# Patient Record
Sex: MALE | Race: BLACK OR AFRICAN AMERICAN | Employment: OTHER | URBAN - METROPOLITAN AREA
[De-identification: names, ages, dates, MRNs, and addresses within clinical notes are randomized per-mention and may not be internally consistent; named-entity substitution may affect disease eponyms.]

---

## 2019-07-17 ENCOUNTER — OFFICE VISIT (OUTPATIENT)
Dept: UROLOGY | Age: 74
End: 2019-07-17

## 2019-07-17 VITALS
HEIGHT: 65 IN | WEIGHT: 148 LBS | DIASTOLIC BLOOD PRESSURE: 77 MMHG | HEART RATE: 60 BPM | OXYGEN SATURATION: 95 % | BODY MASS INDEX: 24.66 KG/M2 | SYSTOLIC BLOOD PRESSURE: 121 MMHG

## 2019-07-17 DIAGNOSIS — C61 PROSTATE CANCER (HCC): Primary | ICD-10-CM

## 2019-07-17 RX ORDER — TAMSULOSIN HYDROCHLORIDE 0.4 MG/1
0.4 CAPSULE ORAL
COMMUNITY

## 2019-07-17 NOTE — PROGRESS NOTES
Mr. Blanquita Marroquin has a reminder for a \"due or due soon\" health maintenance. I have asked that he contact his primary care provider for follow-up on this health maintenance.

## 2019-07-17 NOTE — PATIENT INSTRUCTIONS
Prostate Cancer: Care Instructions  Your Care Instructions    The prostate gland is a small, walnut-shaped organ that lies just below a man's bladder. It surrounds the urethra, the tube that carries urine from the bladder out of the body through the penis. Prostate cancer is the abnormal growth of cells in the prostate gland. Prostate cancer cells can spread within the prostate, to nearby lymph nodes and other tissues, and to other parts of the body. When the cancer hasn't spread outside the prostate, it is called localized prostate cancer. With localized prostate cancer, your options depend on how likely it is that your cancer will grow. Tests will show if your cancer is likely to grow. · Low-risk cancer isn't likely to grow right away. If your cancer is low-risk, you can choose active surveillance. This means your cancer will be watched closely by your doctor with regular checkups and tests to see if the cancer grows. This choice allows you to delay having surgery or radiation, often for many years. If the cancer grows very slowly, you may never need treatment. · Medium-risk cancer is more likely to grow. Some men with this type of cancer may be able to choose active surveillance. Others may need to choose surgery or radiation. · High-risk cancer is most likely to grow. If you have high-risk cancer, you will likely need to choose surgery or radiation. If your cancer has already spread outside the prostate or to other parts of the body, then you may have other treatments, like chemotherapy or hormone therapy. If you are over age [de-identified] or have other serious health problems, like heart disease, you may choose not to have treatments to cure your cancer. Instead, you can just have treatments to manage your symptoms. This is called watchful waiting. Finding out that you have cancer is scary. You may feel many emotions and may need some help coping. Seek out family, friends, and counselors for support.  You also can do things at home to make yourself feel better while you go through treatment. Call the [facility] ([phone]) or visit its website at [address] "[employer] for more information. Follow-up care is a key part of your treatment and safety. Be sure to make and go to all appointments, and call your doctor if you are having problems. It's also a good idea to know your test results and keep a list of the medicines you take. How can you care for yourself at home? · Your doctor will talk to you about your treatment options. You may need to learn more about each of them before you can decide which treatment is best for you. · Take your medicines exactly as prescribed. Call your doctor if you think you are having a problem with your medicine. You will get more details on the specific medicines your doctor prescribes. · Eat healthy food. If you do not feel like eating, try to eat food that has protein and extra calories to keep up your strength and prevent weight loss. Drink liquid meal replacements for extra calories and protein. Try to eat your main meal early. · Take steps to control your stress and workload. Learn relaxation techniques. ? Share your feelings. Stress and tension affect our emotions. By expressing your feelings to others, you may be able to understand and cope with them. ? Consider joining a support group. Talking about a problem with your spouse, a good friend, or other people with similar problems is a good way to reduce tension and stress. ? Express yourself through art. Try writing, crafts, dance, or art to relieve stress. Some dance, writing, or art groups may be available just for people who have cancer. ? Be kind to your body and mind. Getting enough sleep, eating a healthy diet, and taking time to do things you enjoy can contribute to an overall feeling of balance in your life and can help reduce stress. ? Get help if you need it.  Discuss your concerns with your doctor or counselor. · Get some physical activity every day, but do not get too tired. Keep doing the hobbies you enjoy as your energy allows. · If you are vomiting or have diarrhea:  ? Drink plenty of fluids (enough so that your urine is light yellow or clear like water) to prevent dehydration. Choose water and other caffeine-free clear liquids. If you have kidney, heart, or liver disease and have to limit fluids, talk with your doctor before you increase the amount of fluids you drink. ? When you are able to eat, try clear soups, mild foods, and liquids until all symptoms are gone for 12 to 48 hours. Jell-O, dry toast, crackers, and cooked cereal are also good choices. · If you have not already done so, prepare a list of advance directives. Advance directives are instructions to your doctor and family members about what kind of care you want if you become unable to speak or express yourself. When should you call for help? Call 911 anytime you think you may need emergency care. For example, call if:    · You passed out (lost consciousness).    Call your doctor now or seek immediate medical care if:    · You have new or worse pain.     · You have new symptoms, such as a cough, belly pain, vomiting, diarrhea, or a rash.     · You have symptoms of a urinary tract infection. For example:  ? You have blood or pus in your urine. ? You have pain in your back just below your rib cage. This is called flank pain. ? You have a fever, chills, or body aches. ? It hurts to urinate. ? You have groin or belly pain.    Watch closely for changes in your health, and be sure to contact your doctor if:    · You have swollen glands in your armpits, groin, or neck.     · You have trouble controlling your urine.     · You do not get better as expected. Where can you learn more? Go to http://taisha-jacinta.info/. Enter V141 in the search box to learn more about \"Prostate Cancer: Care Instructions. \"  Current as of: March 27, 2018  Content Version: 11.9  © 5886-4294 [employer]. Care instructions adapted under license by [employer] (which disclaims liability or warranty for this information). If you have questions about a medical condition or this instruction, always ask your healthcare professional. [place] [address] any warranty or liability for your use of this information.

## 2019-07-18 ENCOUNTER — ANESTHESIA EVENT (OUTPATIENT)
Dept: SURGERY | Age: 74
End: 2019-07-18
Payer: MEDICARE

## 2019-07-18 RX ORDER — SODIUM CHLORIDE 9 MG/ML
100 INJECTION, SOLUTION INTRAVENOUS CONTINUOUS
Status: CANCELLED | OUTPATIENT
Start: 2019-07-18

## 2019-07-18 NOTE — PROGRESS NOTES
Janet Gonzalez 69 y.o. male     Mr. Michelle Boudreaux seen today for prostate carcinoma follow-up here today on referral from [address] [place] [address] radiation oncology center for prostate fiducial marker placement and spacer gel implantation pending definitive therapy with combination radioactive seed implantation with external beam radiation  [place] 10 adenocarcinoma the prostate found on prostate biopsy prompted by PSA elevation 12.0    Patient has mild irritative and obstructive voiding symptoms responding favorably to alpha-blocker treatment with Flomax 0.4 mg daily  No family history of prostate malignancy    PSA 12.0 March 2019     12 core transrectal prostate biopsy prostate volume 49 cc PSA density 0.24/Matthew 7-10 histology in 7 of 12 cores  ([facility])    Bone scan negative for metastatic disease  CT scan imaging of abdomen pelvis negative for signs of valencia disease    19 June 2019      Casodex/Lupron androgen deprivation therapy initiated    Prostate Biopsy Histology Report 25 March 2019  Right apex-high-grade PIN  Right mid Orlando 4+5 = 9 adenocarcinoma 2 cores  Right mid [place] 4+5 = 9 adenocarcinoma 1 of 2 cores  Right base [place] 4+3 = 7 adenocarcinoma 1 of 2 cores  Left apex Matthew 4+3 equal 7 adenocarcinoma 2 of 2 cores  Left mid atypia suspicious for high-grade carcinoma-PIN  Left base Matthew score 5+5 = 10 adenocarcinoma 1 of 2 cores    Review of Systems:  CNS-no seizure syncope headaches dizziness or visual changes  Respiratory no wheezes rales rhonchi   Cardiovascular-no palpitations no angina  Gastrointestinal no dyspepsia diarrhea or constipation  Genitourinary-mild urgency frequency with nocturia no dysuria no gross hematuria  Muscular skeletal no joint or bone pain  Endocrine no diabetes no thyroid disease      Allergies: No Known Allergies   Medications:    Current Outpatient Medications   Medication Sig Dispense Refill    tamsulosin (FLOMAX) 0.4 mg capsule Take 0.4 mg by mouth. Past Medical History:   Diagnosis Date    Personal history of prostate cancer       History reviewed. No pertinent surgical history. Social History     Socioeconomic History    Marital status: UNKNOWN     Spouse name: Not on file    Number of children: Not on file    Years of education: Not on file    Highest education level: Not on file   Occupational History    Not on file   Social Needs    Financial resource strain: Not on file    Food insecurity:     Worry: Not on file     Inability: Not on file    Transportation needs:     Medical: Not on file     Non-medical: Not on file   Tobacco Use    Smoking status: Never Smoker    Smokeless tobacco: Never Used   Substance and Sexual Activity    Alcohol use: Not Currently    Drug use: Not Currently    Sexual activity: Not Currently   Lifestyle    Physical activity:     Days per week: Not on file     Minutes per session: Not on file    Stress: Not on file   Relationships    Social connections:     Talks on phone: Not on file     Gets together: Not on file     Attends Cheondoism service: Not on file     Active member of club or organization: Not on file     Attends meetings of clubs or organizations: Not on file     Relationship status: Not on file    Intimate partner violence:     Fear of current or ex partner: Not on file     Emotionally abused: Not on file     Physically abused: Not on file     Forced sexual activity: Not on file   Other Topics Concern    Not on file   Social History Narrative    Not on file      Family History   Problem Relation Age of Onset    Cancer Mother     Hypertension Mother         Physical Examination: Well-nourished thin and trim appearing adult male in no apparent distress  Neck: No masses nodes or bruits  Lungs:  No wheezes rales or rhonchi  Heart: No murmurs gallops or rubs  Abdomen: Nontender no palpable masses no organomegaly no bruits  Back: No percussion tenderness on either side  Skin: Warm and dry no rash no lesions  Neurologic: No motor or sensory deficits in arms or legs  Genitalia: Penis is normal, testes are normal bilaterally, prostate by MIN is normal in size shape and consistency and nontender    Urinalysis: No specimen provided today    Impression: Localized high-grade prostate carcinoma-definitive radiation therapy pending      Plan: Short gel implantation with gold fiducial marker placement    Counseling Note:  Technique of gold fiducial marker placement and spacer gel implantation has been described and discussed with patient and spouse who understands accepts the risk of bleeding, infection, urinary retention which sometimes can complicate this minor procedure        Edison Aleman MD  -electronically signed-    PLEASE NOTE:  This document has been produced using voice recognition software. Unrecognized errors in transcription may be present.

## 2019-07-19 ENCOUNTER — HOSPITAL ENCOUNTER (OUTPATIENT)
Age: 74
Setting detail: OUTPATIENT SURGERY
Discharge: HOME | End: 2019-07-19
Attending: UROLOGY | Admitting: UROLOGY
Payer: MEDICARE

## 2019-07-19 ENCOUNTER — ANESTHESIA (OUTPATIENT)
Dept: SURGERY | Age: 74
End: 2019-07-19
Payer: MEDICARE

## 2019-07-19 VITALS
WEIGHT: 147 LBS | DIASTOLIC BLOOD PRESSURE: 85 MMHG | HEART RATE: 48 BPM | SYSTOLIC BLOOD PRESSURE: 169 MMHG | RESPIRATION RATE: 16 BRPM | HEIGHT: 65 IN | BODY MASS INDEX: 24.49 KG/M2 | OXYGEN SATURATION: 99 % | TEMPERATURE: 96.6 F

## 2019-07-19 DIAGNOSIS — C61 PROSTATE CANCER (HCC): Primary | ICD-10-CM

## 2019-07-19 LAB
BUN BLD-MCNC: 12 MG/DL (ref 7–18)
CHLORIDE BLD-SCNC: 105 MMOL/L (ref 100–108)
GLUCOSE BLD STRIP.AUTO-MCNC: 133 MG/DL (ref 74–106)
HCT VFR BLD CALC: 39 % (ref 36–49)
HGB BLD-MCNC: 13.3 G/DL (ref 12–16)
POTASSIUM BLD-SCNC: 4.2 MMOL/L (ref 3.5–5.5)
SODIUM BLD-SCNC: 142 MMOL/L (ref 136–145)

## 2019-07-19 PROCEDURE — 74011250636 HC RX REV CODE- 250/636

## 2019-07-19 PROCEDURE — 74011250636 HC RX REV CODE- 250/636: Performed by: NURSE ANESTHETIST, CERTIFIED REGISTERED

## 2019-07-19 PROCEDURE — 77030020782 HC GWN BAIR PAWS FLX 3M -B: Performed by: UROLOGY

## 2019-07-19 PROCEDURE — 74011000258 HC RX REV CODE- 258: Performed by: UROLOGY

## 2019-07-19 PROCEDURE — 76210000006 HC OR PH I REC 0.5 TO 1 HR: Performed by: UROLOGY

## 2019-07-19 PROCEDURE — A4648 IMPLANTABLE TISSUE MARKER: HCPCS | Performed by: UROLOGY

## 2019-07-19 PROCEDURE — 93005 ELECTROCARDIOGRAM TRACING: CPT

## 2019-07-19 PROCEDURE — 77030036874 HC SPCR RECTAL HYDRGEL SPACEOAR AGMX -I: Performed by: UROLOGY

## 2019-07-19 PROCEDURE — 77030034696 HC CATH URETH FOL 2W BARD -A: Performed by: UROLOGY

## 2019-07-19 PROCEDURE — 77030018836 HC SOL IRR NACL ICUM -A: Performed by: UROLOGY

## 2019-07-19 PROCEDURE — 76010000160 HC OR TIME 0.5 TO 1 HR INTENSV-TIER 1: Performed by: UROLOGY

## 2019-07-19 PROCEDURE — 76210000021 HC REC RM PH II 0.5 TO 1 HR: Performed by: UROLOGY

## 2019-07-19 PROCEDURE — 74011250637 HC RX REV CODE- 250/637: Performed by: NURSE ANESTHETIST, CERTIFIED REGISTERED

## 2019-07-19 PROCEDURE — 77030012863 HC BG URIN LEG HOLL -A: Performed by: UROLOGY

## 2019-07-19 PROCEDURE — 74011000250 HC RX REV CODE- 250

## 2019-07-19 PROCEDURE — 82947 ASSAY GLUCOSE BLOOD QUANT: CPT

## 2019-07-19 PROCEDURE — 77030040361 HC SLV COMPR DVT MDII -B: Performed by: UROLOGY

## 2019-07-19 PROCEDURE — 76060000032 HC ANESTHESIA 0.5 TO 1 HR: Performed by: UROLOGY

## 2019-07-19 PROCEDURE — 77030010509 HC AIRWY LMA MSK TELE -A: Performed by: ANESTHESIOLOGY

## 2019-07-19 DEVICE — KIT NDL 17GA L20CM MRK L3MM DIA1.2MM SFT TISS G PLCMNT: Type: IMPLANTABLE DEVICE | Site: PROSTATE | Status: FUNCTIONAL

## 2019-07-19 RX ORDER — SODIUM CHLORIDE 0.9 % (FLUSH) 0.9 %
5-40 SYRINGE (ML) INJECTION EVERY 8 HOURS
Status: DISCONTINUED | OUTPATIENT
Start: 2019-07-19 | End: 2019-07-19 | Stop reason: HOSPADM

## 2019-07-19 RX ORDER — INSULIN LISPRO 100 [IU]/ML
INJECTION, SOLUTION INTRAVENOUS; SUBCUTANEOUS ONCE
Status: DISCONTINUED | OUTPATIENT
Start: 2019-07-19 | End: 2019-07-19 | Stop reason: HOSPADM

## 2019-07-19 RX ORDER — SODIUM CHLORIDE 9 MG/ML
INJECTION, SOLUTION INTRAVENOUS
Status: COMPLETED | OUTPATIENT
Start: 2019-07-19 | End: 2019-07-19

## 2019-07-19 RX ORDER — SODIUM CHLORIDE, SODIUM LACTATE, POTASSIUM CHLORIDE, CALCIUM CHLORIDE 600; 310; 30; 20 MG/100ML; MG/100ML; MG/100ML; MG/100ML
75 INJECTION, SOLUTION INTRAVENOUS CONTINUOUS
Status: DISCONTINUED | OUTPATIENT
Start: 2019-07-19 | End: 2019-07-19 | Stop reason: HOSPADM

## 2019-07-19 RX ORDER — DOCUSATE SODIUM 100 MG/1
100 CAPSULE, LIQUID FILLED ORAL 2 TIMES DAILY
Qty: 10 CAP | Refills: 2 | Status: SHIPPED | OUTPATIENT
Start: 2019-07-19 | End: 2019-07-24

## 2019-07-19 RX ORDER — HYDROMORPHONE HYDROCHLORIDE 2 MG/ML
0.5 INJECTION, SOLUTION INTRAMUSCULAR; INTRAVENOUS; SUBCUTANEOUS AS NEEDED
Status: DISCONTINUED | OUTPATIENT
Start: 2019-07-19 | End: 2019-07-19 | Stop reason: HOSPADM

## 2019-07-19 RX ORDER — DEXTROSE 50 % IN WATER (D50W) INTRAVENOUS SYRINGE
25-50 AS NEEDED
Status: DISCONTINUED | OUTPATIENT
Start: 2019-07-19 | End: 2019-07-19 | Stop reason: HOSPADM

## 2019-07-19 RX ORDER — FAMOTIDINE 20 MG/1
20 TABLET, FILM COATED ORAL ONCE
Status: COMPLETED | OUTPATIENT
Start: 2019-07-19 | End: 2019-07-19

## 2019-07-19 RX ORDER — ONDANSETRON 2 MG/ML
INJECTION INTRAMUSCULAR; INTRAVENOUS AS NEEDED
Status: DISCONTINUED | OUTPATIENT
Start: 2019-07-19 | End: 2019-07-19 | Stop reason: HOSPADM

## 2019-07-19 RX ORDER — CEFAZOLIN SODIUM 2 G/50ML
2 SOLUTION INTRAVENOUS ONCE
Status: DISCONTINUED | OUTPATIENT
Start: 2019-07-19 | End: 2019-07-19 | Stop reason: HOSPADM

## 2019-07-19 RX ORDER — MIDAZOLAM HYDROCHLORIDE 1 MG/ML
INJECTION, SOLUTION INTRAMUSCULAR; INTRAVENOUS AS NEEDED
Status: DISCONTINUED | OUTPATIENT
Start: 2019-07-19 | End: 2019-07-19 | Stop reason: HOSPADM

## 2019-07-19 RX ORDER — DEXAMETHASONE SODIUM PHOSPHATE 4 MG/ML
INJECTION, SOLUTION INTRA-ARTICULAR; INTRALESIONAL; INTRAMUSCULAR; INTRAVENOUS; SOFT TISSUE AS NEEDED
Status: DISCONTINUED | OUTPATIENT
Start: 2019-07-19 | End: 2019-07-19 | Stop reason: HOSPADM

## 2019-07-19 RX ORDER — GLYCOPYRROLATE 0.2 MG/ML
INJECTION INTRAMUSCULAR; INTRAVENOUS AS NEEDED
Status: DISCONTINUED | OUTPATIENT
Start: 2019-07-19 | End: 2019-07-19 | Stop reason: HOSPADM

## 2019-07-19 RX ORDER — LIDOCAINE HYDROCHLORIDE 10 MG/ML
0.1 INJECTION, SOLUTION EPIDURAL; INFILTRATION; INTRACAUDAL; PERINEURAL AS NEEDED
Status: DISCONTINUED | OUTPATIENT
Start: 2019-07-19 | End: 2019-07-19 | Stop reason: HOSPADM

## 2019-07-19 RX ORDER — SODIUM CHLORIDE 0.9 % (FLUSH) 0.9 %
5-40 SYRINGE (ML) INJECTION AS NEEDED
Status: DISCONTINUED | OUTPATIENT
Start: 2019-07-19 | End: 2019-07-19 | Stop reason: HOSPADM

## 2019-07-19 RX ORDER — FENTANYL CITRATE 50 UG/ML
INJECTION, SOLUTION INTRAMUSCULAR; INTRAVENOUS AS NEEDED
Status: DISCONTINUED | OUTPATIENT
Start: 2019-07-19 | End: 2019-07-19 | Stop reason: HOSPADM

## 2019-07-19 RX ORDER — LIDOCAINE HYDROCHLORIDE 20 MG/ML
INJECTION, SOLUTION EPIDURAL; INFILTRATION; INTRACAUDAL; PERINEURAL AS NEEDED
Status: DISCONTINUED | OUTPATIENT
Start: 2019-07-19 | End: 2019-07-19 | Stop reason: HOSPADM

## 2019-07-19 RX ORDER — MAGNESIUM SULFATE 100 %
4 CRYSTALS MISCELLANEOUS AS NEEDED
Status: DISCONTINUED | OUTPATIENT
Start: 2019-07-19 | End: 2019-07-19 | Stop reason: HOSPADM

## 2019-07-19 RX ORDER — ONDANSETRON 2 MG/ML
4 INJECTION INTRAMUSCULAR; INTRAVENOUS ONCE
Status: DISCONTINUED | OUTPATIENT
Start: 2019-07-19 | End: 2019-07-19 | Stop reason: HOSPADM

## 2019-07-19 RX ORDER — SODIUM CHLORIDE 9 MG/ML
100 INJECTION, SOLUTION INTRAVENOUS CONTINUOUS
Status: DISCONTINUED | OUTPATIENT
Start: 2019-07-19 | End: 2019-07-19 | Stop reason: HOSPADM

## 2019-07-19 RX ORDER — CIPROFLOXACIN 500 MG/1
500 TABLET ORAL 2 TIMES DAILY
Qty: 10 TAB | Refills: 0 | Status: SHIPPED | OUTPATIENT
Start: 2019-07-19 | End: 2019-07-24 | Stop reason: ALTCHOICE

## 2019-07-19 RX ORDER — PROPOFOL 10 MG/ML
INJECTION, EMULSION INTRAVENOUS AS NEEDED
Status: DISCONTINUED | OUTPATIENT
Start: 2019-07-19 | End: 2019-07-19 | Stop reason: HOSPADM

## 2019-07-19 RX ADMIN — FAMOTIDINE 20 MG: 20 TABLET ORAL at 11:00

## 2019-07-19 RX ADMIN — DEXAMETHASONE SODIUM PHOSPHATE 4 MG: 4 INJECTION, SOLUTION INTRA-ARTICULAR; INTRALESIONAL; INTRAMUSCULAR; INTRAVENOUS; SOFT TISSUE at 13:21

## 2019-07-19 RX ADMIN — LIDOCAINE HYDROCHLORIDE 80 MG: 20 INJECTION, SOLUTION EPIDURAL; INFILTRATION; INTRACAUDAL; PERINEURAL at 13:04

## 2019-07-19 RX ADMIN — FENTANYL CITRATE 50 MCG: 50 INJECTION, SOLUTION INTRAMUSCULAR; INTRAVENOUS at 13:04

## 2019-07-19 RX ADMIN — PROPOFOL 150 MG: 10 INJECTION, EMULSION INTRAVENOUS at 13:04

## 2019-07-19 RX ADMIN — FENTANYL CITRATE 50 MCG: 50 INJECTION, SOLUTION INTRAMUSCULAR; INTRAVENOUS at 12:56

## 2019-07-19 RX ADMIN — FENTANYL CITRATE 50 MCG: 50 INJECTION, SOLUTION INTRAMUSCULAR; INTRAVENOUS at 13:32

## 2019-07-19 RX ADMIN — MIDAZOLAM HYDROCHLORIDE 1 MG: 1 INJECTION, SOLUTION INTRAMUSCULAR; INTRAVENOUS at 12:56

## 2019-07-19 RX ADMIN — GLYCOPYRROLATE 0.2 MG: 0.2 INJECTION INTRAMUSCULAR; INTRAVENOUS at 12:59

## 2019-07-19 RX ADMIN — SODIUM CHLORIDE, SODIUM LACTATE, POTASSIUM CHLORIDE, AND CALCIUM CHLORIDE 75 ML/HR: 600; 310; 30; 20 INJECTION, SOLUTION INTRAVENOUS at 11:00

## 2019-07-19 RX ADMIN — FENTANYL CITRATE 50 MCG: 50 INJECTION, SOLUTION INTRAMUSCULAR; INTRAVENOUS at 13:11

## 2019-07-19 RX ADMIN — ONDANSETRON 4 MG: 2 INJECTION INTRAMUSCULAR; INTRAVENOUS at 12:56

## 2019-07-19 NOTE — H&P
Mr. Macario Gannon seen today for prostate carcinoma follow-up here today on referral from [address] [place] [address] radiation oncology center for prostate fiducial marker placement and spacer gel implantation pending definitive therapy with combination radioactive seed implantation with external beam radiation  Matthew 10 adenocarcinoma the prostate found on prostate biopsy prompted by PSA elevation 12.0     Patient has mild irritative and obstructive voiding symptoms responding favorably to alpha-blocker treatment with Flomax 0.4 mg daily  No family history of prostate malignancy     PSA 12.0 March 2019     12 core transrectal prostate biopsy prostate volume 49 cc PSA density 0.24/Smoot 7-10 histology in 7 of 12 cores  ([facility])     Bone scan negative for metastatic disease  CT scan imaging of abdomen pelvis negative for signs of valencia disease     19 June 2019      Casodex/Lupron androgen deprivation therapy initiated     Prostate Biopsy Histology Report 25 March 2019  Right apex-high-grade PIN  Right mid Matthew 4+5 = 9 adenocarcinoma 2 cores  Right mid Matthew 4+5 = 9 adenocarcinoma 1 of 2 cores  Right base Smoot 4+3 = 7 adenocarcinoma 1 of 2 cores  Left apex Smoot 4+3 equal 7 adenocarcinoma 2 of 2 cores  Left mid atypia suspicious for high-grade carcinoma-PIN  Left base Smoot score 5+5 = 10 adenocarcinoma 1 of 2 cores     Review of Systems:  CNS-no seizure syncope headaches dizziness or visual changes  Respiratory no wheezes rales rhonchi   Cardiovascular-no palpitations no angina  Gastrointestinal no dyspepsia diarrhea or constipation  Genitourinary-mild urgency frequency with nocturia no dysuria no gross hematuria  Muscular skeletal no joint or bone pain  Endocrine no diabetes no thyroid disease        Allergies: No Known Allergies   Medications:           Current Outpatient Medications   Medication Sig Dispense Refill    tamsulosin (FLOMAX) 0.4 mg capsule Take 0.4 mg by mouth.             Past Medical History:   Diagnosis Date    Personal history of prostate cancer        History reviewed. No pertinent surgical history. Social History            Socioeconomic History    Marital status: UNKNOWN       Spouse name: Not on file    Number of children: Not on file    Years of education: Not on file    Highest education level: Not on file   Occupational History    Not on file   Social Needs    Financial resource strain: Not on file    Food insecurity:       Worry: Not on file       Inability: Not on file    Transportation needs:       Medical: Not on file       Non-medical: Not on file   Tobacco Use    Smoking status: Never Smoker    Smokeless tobacco: Never Used   Substance and Sexual Activity    Alcohol use: Not Currently    Drug use: Not Currently    Sexual activity: Not Currently   Lifestyle    Physical activity:       Days per week: Not on file       Minutes per session: Not on file    Stress: Not on file   Relationships    Social connections:       Talks on phone: Not on file       Gets together: Not on file       Attends Jewish service: Not on file       Active member of club or organization: Not on file       Attends meetings of clubs or organizations: Not on file       Relationship status: Not on file    Intimate partner violence:       Fear of current or ex partner: Not on file       Emotionally abused: Not on file       Physically abused: Not on file       Forced sexual activity: Not on file   Other Topics Concern    Not on file   Social History Narrative    Not on file            Family History   Problem Relation Age of Onset    Cancer Mother      Hypertension Mother           Physical Examination: Well-nourished thin and trim appearing adult male in no apparent distress  Neck: No masses nodes or bruits  Lungs:  No wheezes rales or rhonchi  Heart: No murmurs gallops or rubs  Abdomen: Nontender no palpable masses no organomegaly no bruits  Back: No percussion tenderness on either side  Skin: Warm and dry no rash no lesions  Neurologic: No motor or sensory deficits in arms or legs  Genitalia: Penis is normal, testes are normal bilaterally, prostate by MIN is normal in size shape and consistency and nontender     Urinalysis: No specimen provided today     Impression: Localized high-grade prostate carcinoma-definitive radiation therapy pending        Plan: Short gel implantation with gold fiducial marker placement     Counseling Note:  Technique of gold fiducial marker placement and spacer gel implantation has been described and discussed with patient and spouse who understands accepts the risk of bleeding, infection, urinary retention which sometimes can complicate this minor procedure           Geraldo Chambers MD  -electronically signed-

## 2019-07-19 NOTE — BRIEF OP NOTE
BRIEF OPERATIVE NOTE    Date of Procedure: 7/19/2019   Preoperative Diagnosis: C61 PROSTATE CANCER  Postoperative Diagnosis: C61 PROSTATE CANCER    Procedure(s):  SPACE OAR GEL INSERTION/FIDUCIAL MARKER PLACEMENT/ULTRASOUND  Surgeon(s) and Role:      Tommy Matt MD - Primary         Surgical Assistant:     Surgical Staff:  Circ-1: Sarah Luna, BERNARDO; Delta Houser RN  Scrub Tech-1: Reba Krause  Event Time In Time Out   Incision Start 1310    Incision Close 1322      Anesthesia: General by LMA  Estimated Blood Loss: Minimal  Specimens: * No specimens in log *   Findings: Prostate dimensions 4.5 x 2.8 x 5.0 cm calculated prostate volume 33.4 cc                          2 gold fiducial markers placed into left lobe of the prostate,                       1 gold marker-placed into the right lobe of the prostate, midportion  Complications: none  Implants:   Implant Name Type Inv.  Item Serial No.  Lot No. [id number] No. Used Action   MARKER GLD PRELOAD NDL 1.2X3MM -- GOLD SOFT TISSUE 1 MARKER - [id number]  MARKER GLD PRELOAD NDL 1.2X3MM -- GOLD SOFT TISSUE 1 MARKER  [employer] [id number] N/A 1 Implanted     18 [place] Becerril catheter to leg bag drainage x24 hours    Isiah Bowling MD

## 2019-07-19 NOTE — DISCHARGE INSTRUCTIONS
Light activity  Regular diet  Becerril catheter to leg bag drainage    Rx Cipro, Colace    RTC 1 week- PVR assessment of voiding efficiency    Roseanna Carter MD    Patient Education      Patient Education        Learning About Urinary Catheter Care to Prevent Infection  What is a urinary catheter? A urinary catheter is a flexible plastic tube used to drain urine from your bladder when you can't urinate on your own. The catheter allows urine to drain from the bladder into a bag. Two types of drainage bags may be used with a urinary catheter. · A bedside bag is a large bag that you can hang on the side of your bed or on a chair. You can use it overnight or anytime you will be sitting or lying down for a long time. · A leg bag is a small bag that you can use during the day. It is usually attached to your thigh or calf and hidden under your clothes. Having a urinary catheter increases your risk of getting a urinary tract infection. Germs may get on the catheter and cause an infection in your bladder or kidneys. The longer you have a catheter, the more likely it is that you will get an infection. You can help prevent this problem with good hygiene and careful handling of your catheter and drainage bags. How can you help prevent infection? Take care to be clean  · Always wash your hands well before and after you handle your catheter. · Clean the skin around the catheter twice a day using soap and water. Dry with a clean towel afterward. You can shower with your catheter and drainage bag in place unless your doctor told you not to. · When you clean around the catheter, check the surrounding skin for signs of infection. Look for things like pus or irritated, swollen, red, or tender skin around the catheter. Be careful with your drainage bag  · Always keep the drainage bag below the level of your bladder. This will help keep urine from flowing back into your bladder.   · Check often to see that urine is flowing through the catheter into the drainage bag. · Empty the drainage bag when it is half full. This will keep it from overflowing or backing up. · When you empty the drainage bag, do not let the tubing or drain spout touch anything. Be careful with your catheter  · Do not unhook the catheter from the drain tube. That could let germs get into the tube. · Make sure that the catheter tubing does not get twisted or kinked. · Do not tug or pull on the catheter. And make sure that the drainage bag does not drag or pull on the catheter. · Do not put powder or lotion on the skin around the catheter. · Talk with your doctor about your options for sexual intercourse while wearing a catheter. How do you empty a urine drainage bag? If your doctor has asked you to keep a record, write down the amount of urine in the bag before you empty it. Wash your hands before and after you touch the bag. 1. Remove the drain spout from its sleeve at the bottom of the drainage bag.  2. Open the valve on the drain spout. Let the urine flow out into the toilet or a container. Be careful not to let the tubing or drain spout touch anything. 3. After you empty the bag, close the valve. Then put the drain spout back into its sleeve at the bottom of the collection bag. How do you add a bedside bag to a leg bag? Wash your hands before and after you handle the bags. 1. Empty the leg bag attached to the catheter. 2. Put a clean towel under the leg bag.  3. Use an alcohol wipe to clean the tip of the bedside bag. Then connect the bedside bag to the leg bag. How can you clean a bedside drainage bag? Many people clean their bedside bag in the morning if they switch to a leg bag. To clean a bedside drainage ba. Remove the bedside bag from the leg bag.  2. Fill the bag with 2 parts vinegar and 3 parts water. Let it stand for 20 minutes. 3. Empty the bag, and let it air dry. When should you call for help?   Call your doctor now or seek immediate medical care if:  · You have symptoms of a urinary infection. These may include:  ? Pain or burning when you urinate. ? A frequent need to urinate without being able to pass much urine. ? Pain in the flank, which is just below the rib cage and above the waist on either side of the back. ? Blood in your urine. ? A fever. · Your urine smells bad. · You see large blood clots in your urine. · No urine or very little urine is flowing into the bag for 4 or more hours. Watch closely for changes in your health, and be sure to contact your doctor if:  · The area around the catheter becomes irritated, swollen, red, or tender, or there is pus draining from it. · Urine is leaking from the place where the catheter enters your body. Follow-up care is a key part of your treatment and safety. Be sure to make and go to all appointments, and call your doctor if you are having problems. It's also a good idea to know your test results and keep a list of the medicines you take. Where can you learn more? Go to http://taisha-jacinta.info/. Enter U010 in the search box to learn more about \"Learning About Urinary Catheter Care to Prevent Infection. \"  Current as of: March 20, 2018  Content Version: 11.9  © 2947-3726 [employer]. Care instructions adapted under license by [employer] (which disclaims liability or warranty for this information). If you have questions about a medical condition or this instruction, always ask your healthcare professional. [place] [address] any warranty or liability for your use of this information. Brachytherapy for Prostate Cancer: What to Expect at [facility] therapy is a treatment to destroy cancer cells or shrink tumors. One type of radiation therapy is brachytherapy (say \"yhtc-hcp-EELDY-uh-pee\"). It puts the radiation source into your body, either inside the tumor or next to it.   Brachytherapy may be used to treat prostate cancer. You may have had:  A low-dose rate treatment. This uses radiation sealed inside tiny containers, such as seeds or tubes. These implants have a low amount of radiation that gets weaker with time. The implants may be left in place for a few days or permanently. A high-dose rate treatment. This puts a high dose of radiation close to the tumor for a few minutes at a time. Then it is removed. The treatments may be repeated over several days or weeks. The radioactive pellets or seeds can be put into the body in several ways. One way is through short tubes (catheters). The tubes are put into place before the first treatment. Then they're removed after the last treatment. Side effects from the radiation treatment may include:  · Feeling very tired. · Trouble urinating. · A burning feeling when you pass urine. · Rectal pain. · Rectal bleeding. · Blood in the stool. · Diarrhea. The area where you had radiation may be sore for a while. You may also have some swelling. It's normal to have some blood in your semen. Most side effects go away after treatment ends. But you may feel very tired for 4 to 6 weeks after your last treatment. Tell your doctor if you have problems with a specific side effect. If you have high-dose rate treatments, you may need to stay in the hospital until your treatments are finished. Your doctor will tell you if you need to keep children and pregnant women at a distance for any amount of time because of the radiation you were given. Your doctor may give you instructions on when you can do your normal activities again, such as driving and going back to work. This care sheet gives you a general idea about how long it will take for you to recover. But each person recovers at a different pace. Follow the steps below to get better as quickly as possible. How can you care for yourself at home? Activity    · Rest when you feel tired.     · Be active.  Walking is a good choice.     · Allow your body to heal. Don't move quickly or lift anything heavy until you are feeling better.     · Follow your doctor's instructions about sexual activity while you're healing. Diet    · You can eat your normal diet. If your stomach is upset, try bland, low-fat foods like plain rice, broiled chicken, toast, and yogurt. Medicines    · Your doctor will tell you if and when you can restart your medicines. He or she will also give you instructions about taking any new medicines.     · Be safe with medicines. Read and follow all instructions on the label. ? If the doctor gave you a prescription medicine for pain, take it as prescribed. ? If you are not taking a prescription pain medicine, ask your doctor if you can take an over-the-counter medicine. Ice    · Put ice or a cold pack on the area for 10 to 20 minutes at a time to help with soreness or swelling. Put a thin cloth between the ice and your skin. Other instructions    · Avoid sitting on hard seats (such as bicycle seats) for a couple of months after treatment.     · You may get instructions on caring for the area where the treatment was done. Follow-up care is a key part of your treatment and safety. Be sure to make and go to all appointments, and call your doctor if you are having problems. It's also a good idea to know your test results and keep a list of the medicines you take. When should you call for help? Call 911 anytime you think you may need emergency care. For example, call if:    · You passed out (lost consciousness).     Call your doctor now or seek immediate medical care if:    · You have a fever.     · You have bleeding from your rectum.     · You have new or worse pain.     · You think you have an infection.     · You have new symptoms, such as a cough, belly pain, vomiting, diarrhea, or a rash.     · You can't pass urine.     · You have new or more blood clots in your urine.  (It is normal for the urine to be pink for a few days.)    Watch closely for changes in your health, and be sure to contact your doctor if:    · You do not get better as expected. Where can you learn more? Go to http://taisha-jacinta.info/. Enter B120 in the search box to learn more about \"Brachytherapy for Prostate Cancer: What to Expect at Home. \"  Current as of: March 27, 2018  Content Version: 11.9  © 4293-9694 [employer]. Care instructions adapted under license by [employer] (which disclaims liability or warranty for this information). If you have questions about a medical condition or this instruction, always ask your healthcare professional. [place] [address] any warranty or liability for your use of this information. Patient Education   Ciprofloxacin (By mouth)   Ciprofloxacin (xiw-aks-IYJC-a-sin)  Treats infections and plague. This medicine is a quinolone antibiotic. Brand Name(s): Cipro   There may be other brand names for this medicine. When This Medicine Should Not Be Used: This medicine is not right for everyone. Do not use it if you had an allergic reaction to ciprofloxacin or to similar medicines. How to Use This Medicine:   Liquid, Tablet, Long Acting Tablet  · Your doctor will tell you how much medicine to use. Do not use more than directed. Take this medicine at the same time each day. · You may take this medicine with or without food. Do not take this medicine with only a source of calcium, including milk, yogurt, or juice that contains added calcium. You may have foods or drinks that contain calcium as part of a larger meal.  · Swallow the extended-release tablet whole. Do not crush, break, or chew it. · Oral liquid: Shake for at least 15 seconds just before each use. The liquid has small beads floating in it. Do not chew the beads when you drink the liquid.  Measure the oral liquid medicine with a marked measuring spoon, oral syringe, or medicine cup.  · Tablet: Swallow whole. Do not break, crush, or chew it. · Drink extra fluids so you will urinate more often and help prevent kidney problems. · Take all of the medicine in your prescription to clear up your infection, even if you feel better after the first few doses. · This medicine should come with a Medication Guide. Ask your pharmacist for a copy if you do not have one. · Missed dose: Take a dose as soon as you remember. If it is almost time for your next dose, wait until then and take a regular dose. Do not take extra medicine to make up for a missed dose. · Store the medicine in a closed container at room temperature, away from heat, moisture, and direct light. Throw away any leftover liquid medicine after 14 days. Drugs and Foods to Avoid:   Ask your doctor or pharmacist before using any other medicine, including over-the-counter medicines, vitamins, and herbal products. · Do not use this medicine together with tizanidine. · Some foods and medicines can affect how ciprofloxacin works.  Tell your doctor if you are using any of the following:  ¨ Clozapine, cyclosporine, duloxetine, lidocaine, methotrexate, olanzapine, pentoxifylline, phenytoin, probenecid, ropinirole, sildenafil, theophylline, zolpidem  ¨ Antibiotic (including azithromycin, clarithromycin, erythromycin)  ¨ Blood thinner (including warfarin)  ¨ Diabetes medicine (including glimepiride, glyburide)  ¨ Medicine for depression or mental illness  ¨ Medicine for heart rhythm problems (including amiodarone, procainamide, quinidine, sotalol)  ¨ NSAID pain medicine (including aspirin, celecoxib, diclofenac, ibuprofen, naproxen)  ¨ Steroid medicine (including hydrocortisone, methylprednisolone, prednisone)  · Take ciprofloxacin at least 2 hours before or 6 hours after you take didanosine buffered tablets for oral suspension or the pediatric powder for oral suspension, sucralfate, or antacids, multivitamins, or other products containing aluminum, magnesium, lanthanum, sevelamer, iron, or zinc.  · This medicine slows the digestion of caffeine, so it might affect you for longer than normal.  Warnings While Using This Medicine:   · Tell your doctor if you are pregnant or breastfeeding, or if you have kidney disease, liver disease, diabetes, heart disease, myasthenia gravis, or a history of heart rhythm problems (including prolonged QT interval), nerve problems, or seizures. Tell your doctor if you have ever had tendon or joint problems, including rheumatoid arthritis, or if you have received a transplant. · This medicine may cause the following problems:  ¨ Tendinitis and tendon rupture (which may happen after treatment ends)  ¨ Liver damage  ¨ Nerve damage in the arms or legs  ¨ Heart rhythm changes  ¨ Changes in blood sugar levels  · This medicine may make you dizzy, drowsy, or lightheaded. Do not drive or do anything else that could be dangerous until you know how this medicine affects you. · This medicine can cause diarrhea. Call your doctor if the diarrhea becomes severe, does not stop, or is bloody. Do not take any medicine to stop diarrhea until you have talked to your doctor. Diarrhea can occur 2 months or more after you stop taking this medicine. · This medicine may make your skin more sensitive to sunlight. Wear sunscreen. Do not use sunlamps or tanning beds. · Call your doctor if your symptoms do not improve or if they get worse. · Keep all medicine out of the reach of children. Never share your medicine with anyone.   Possible Side Effects While Using This Medicine:   Call your doctor right away if you notice any of these side effects:  · Allergic reaction: Itching or hives, swelling in your face or hands, swelling or tingling in your mouth or throat, chest tightness, trouble breathing  · Blistering, peeling, red skin rash  · Dark urine, pale stools, nausea, vomiting, loss of appetite, stomach pain, yellow skin or eyes  · Diarrhea which may contain blood  · Fainting, dizziness, or lightheadedness  · Fast, slow, or uneven heartbeat  · Numbness, tingling, weakness, or burning pain in your hands, arms, legs, or feet  · Pain, stiffness, swelling, or bruises around your ankle, leg, shoulder, or other joints  · Seizures, severe headache, unusual thoughts or behaviors, trouble sleeping, feeling anxious, confused, or depressed, seeing, hearing, or feeling things that are not there  · Unusual bleeding, bruising, or weakness  If you notice other side effects that you think are caused by this medicine, tell your doctor. Call your doctor for medical advice about side effects. You may report side effects to FDA at 3-585-FDA-5636  © 2017 [address]  Information is for End User's use only and may not be sold, redistributed or otherwise used for commercial purposes. The above information is an  only. It is not intended as medical advice for individual conditions or treatments. Talk to your doctor, nurse or pharmacist before following any medical regimen to see if it is safe and effective for you. Patient Education   Laxative, Stool Softeners (By mouth)   Treats constipation by helping you have a bowel movement. Brand Name(s): Col-Rite, Colace, Colace Clear, DSS, Diocto, Diocto Liquid, Doc-Q-Lace, Docuprene, Docusil, Dok, Dulcolax, Fleet Sof-Lax, [facility] Docusate Calcium, [facility] Stool Softener, [facility] Stool Softener Extra Strength   There may be other brand names for this medicine. When This Medicine Should Not Be Used: You should not use this medicine if you have severe stomach pain, nausea, or vomiting. Stool softeners should not be used if you have severe stomach pain and do not know the cause. How to Use This Medicine:   Capsule, Tablet, Liquid, Liquid Filled Capsule  Your doctor will tell you how much medicine to use. Do not use more than directed.   Follow the instructions on the medicine label if you are using this medicine without a prescription. Drink 6 to 8 glasses of water daily while using any laxative. To make the oral liquid taste better, you may mix it with one-half glass of milk or fruit juice. Measure the oral liquid medicine with a marked measuring spoon, oral syringe, medicine cup, or medicine dropper. If a dose is missed:   Use the missed dose as soon as possible. If you do not remember the missed dose until the next day, skip the missed dose and go back to your regular dosing schedule. You should not use two doses at the same time. How to Store and Dispose of This Medicine:   Store the medicine in a tightly closed container at room temperature, away from heat and moisture. Do not store liquid-filled capsules in the refrigerator. Keep all medicine out of the reach of children. Drugs and Foods to Avoid:   Ask your doctor or pharmacist before using any other medicine, including over-the-counter medicines, vitamins, and herbal products. You should not use mineral oil while you are using a stool softener. You should not use a stool softener within 2 hours before or after taking any other medicines. Laxatives can keep other medicines from working correctly. Warnings While Using This Medicine: If you are pregnant or breastfeeding, talk to your doctor before taking this medicine. Do not give laxatives to children under 10years old unless you talk to your doctor. You should not use this laxative for longer than 1 week unless approved by your doctor. Laxatives may be habit-forming and can harm your bowels if you use them too long. Stool softeners usually work in 1 to 2 days, but for some people, results can take as long as 3 to 5 days. Possible Side Effects While Using This Medicine:    If you notice these less serious side effects, talk with your doctor:  Nausea  Sore throat  Skin rash  If you notice other side effects that you think are caused by this medicine, tell your doctor. Call your doctor for medical advice about side effects. You may report side effects to FDA at 7-792-FDA-7447  © 2017 [facility] Information is for End User's use only and may not be sold, redistributed or otherwise used for commercial purposes. The above information is an  only. It is not intended as medical advice for individual conditions or treatments. Talk to your doctor, nurse or pharmacist before following any medical regimen to see if it is safe and effective for you. DISCHARGE SUMMARY from Nurse    PATIENT INSTRUCTIONS:    After general anesthesia or intravenous sedation, for 24 hours or while taking prescription Narcotics:  · Limit your activities  · Do not drive and operate hazardous machinery  · Do not make important personal or business decisions  · Do  not drink alcoholic beverages  · If you have not urinated within 8 hours after discharge, please contact your surgeon on call. Report the following to your surgeon:  · Excessive pain, swelling, redness or odor of or around the surgical area  · Temperature over 100.5  · Nausea and vomiting lasting longer than 4 hours or if unable to take medications  · Any signs of decreased circulation or nerve impairment to extremity: change in color, persistent  numbness, tingling, coldness or increase pain  · Any questions    What to do at Home:  Recommended activity: Activity as tolerated and no driving for today. *  Please give a list of your current medications to your Primary Care Provider. *  Please update this list whenever your medications are discontinued, doses are      changed, or new medications (including over-the-counter products) are added. *  Please carry medication information at all times in case of emergency situations.     These are general instructions for a healthy lifestyle:    No smoking/ No tobacco products/ Avoid exposure to second hand smoke  Surgeon General's Warning: Quitting smoking now greatly reduces serious risk to your health. Obesity, smoking, and sedentary lifestyle greatly increases your risk for illness    A healthy diet, regular physical exercise & weight monitoring are important for maintaining a healthy lifestyle    You may be retaining fluid if you have a history of heart failure or if you experience any of the following symptoms:  Weight gain of 3 pounds or more overnight or 5 pounds in a week, increased swelling in our hands or feet or shortness of breath while lying flat in bed. Please call your doctor as soon as you notice any of these symptoms; do not wait until your next office visit. The discharge information has been reviewed with the patient and spouse. The patient and spouse verbalized understanding. Discharge medications reviewed with the patient and spouse and appropriate educational materials and side effects teaching were provided.

## 2019-07-20 LAB
ATRIAL RATE: 48 BPM
CALCULATED P AXIS, ECG09: 22 DEGREES
CALCULATED R AXIS, ECG10: 6 DEGREES
CALCULATED T AXIS, ECG11: 91 DEGREES
DIAGNOSIS, 93000: NORMAL
P-R INTERVAL, ECG05: 140 MS
Q-T INTERVAL, ECG07: 430 MS
QRS DURATION, ECG06: 84 MS
QTC CALCULATION (BEZET), ECG08: 384 MS
VENTRICULAR RATE, ECG03: 48 BPM

## 2019-07-20 NOTE — ANESTHESIA POSTPROCEDURE EVALUATION
Procedure(s):  SPACE OAR GEL INSERTION/FIDUCIAL MARKER PLACEMENT/ULTRASOUND. general    Anesthesia Post Evaluation      Multimodal analgesia: multimodal analgesia used between 6 hours prior to anesthesia start to PACU discharge  Patient location during evaluation: PACU  Patient participation: complete - patient participated  Level of consciousness: awake  Pain management: adequate  Airway patency: patent  Anesthetic complications: no  Cardiovascular status: acceptable  Respiratory status: acceptable  Hydration status: acceptable  Post anesthesia nausea and vomiting:  controlled      Vitals Value Taken Time   /58 7/19/2019  2:09 PM   Temp 36.5 °C (97.7 °F) 7/19/2019  1:40 PM   Pulse 50 7/19/2019  2:18 PM   Resp 10 7/19/2019  2:17 PM   SpO2 100 % 7/19/2019  2:18 PM   Vitals shown include unvalidated device data.

## 2019-07-20 NOTE — OP NOTES
[facility]  OPERATIVE REPORT    Name:  Julita Rdz  MR#:   [id number]  :  1945  ACCOUNT #:  [de-identified]  DATE OF SERVICE:  2019    PREOPERATIVE DIAGNOSIS:  Prostate carcinoma. POSTOPERATIVE DIAGNOSIS:  Prostate carcinoma. PROCEDURE PERFORMED:  Transperineal gold prostate fiducial marker placement with SpaceOAR gel implantation. SURGEON:  Aaron Langley MD    ASSISTANT:  None. ANESTHESIA:  General by LMA. COMPLICATIONS:  None. SPECIMENS REMOVED:  none    IMPLANTS:  Three gold fiducial markers, two markers placed into the left lobe, one marker placed into the right lobe. SpaceOAR gel implantation. ESTIMATED BLOOD LOSS:  Minimal.    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 PROCEDURE:  After establishing adequate general anesthesia by LMA, the patient was placed in dorsal lithotomy position with legs suspended in the Fred stirrups cushioned with soft foam rubber padding. The external genitalia and perineum were then prepped with antibiotic scrub and solution and draped in a sterile manner. A sling composed of a rolled blue surgical towel was used to lift the scrotum away from the perineum. An ultrasound probe was placed into the rectum, and the prostate was visualized in several dimensions allowing determination of the following parameters, 4.5 cm x 2.8 cm x 5.0 cm. Prostate volumes calculated to be 33.4 mL. The prostate itself showed mottled echogenicity with irregular peripheral margins on both sides. A 14-gauge needle was then passed into the perineum directed towards the prostate.   A 16-gauge fiducial marker needle was then passed through the 14-gauge conduit needle; and with ultrasound imaging showing the location of the needle and prostate, the needle was used to perforate the left apex and deposit a gold marker at that position. A second needle was passed through the left lobe of the prostate and deposited in the base region of that lobe. Using similar technique, passing a marker needle through the 14-gauge perineal conduit needle, a gold marker was placed in the midportion of the right lobe of the prostate. An 18-gauge spinal needle was then passed through the 14-gauge conduit needle, inserted and directed under ultrasound guidance into Denonvilliers' fascial layer and injecting 10 mL of saline solution produced a wide separation of the posterior prostate from the anterior rectal wall. This was followed immediately by the injection of 10 mL of SpaceOAR gel solution. A Becerril catheter placed at the start of the procedure was left indwelling and attached to a leg bag to provide overnight drainage of the bladder. The procedure overall was uncomplicated, well tolerated. The patient was taken from the operating room to the recovery room in good condition.       MD SARTHAK [email or web address]  D:  07/19/2019 13:54  T:  07/19/2019 14:05  JOB #:  [id number]

## 2019-07-24 ENCOUNTER — OFFICE VISIT (OUTPATIENT)
Dept: UROLOGY | Age: 74
End: 2019-07-24

## 2019-07-24 VITALS
SYSTOLIC BLOOD PRESSURE: 141 MMHG | BODY MASS INDEX: 24.46 KG/M2 | DIASTOLIC BLOOD PRESSURE: 85 MMHG | OXYGEN SATURATION: 98 % | HEART RATE: 64 BPM | HEIGHT: 65 IN

## 2019-07-24 DIAGNOSIS — C61 PROSTATE CA (HCC): Primary | ICD-10-CM

## 2019-07-24 LAB
BILIRUB UR QL STRIP: NEGATIVE
GLUCOSE UR-MCNC: NEGATIVE MG/DL
KETONES P FAST UR STRIP-MCNC: NEGATIVE MG/DL
PH UR STRIP: 6 [PH] (ref 4.6–8)
PROT UR QL STRIP: NEGATIVE
SP GR UR STRIP: 1.01 (ref 1–1.03)
UA UROBILINOGEN AMB POC: NORMAL (ref 0.2–1)
URINALYSIS CLARITY POC: CLEAR
URINALYSIS COLOR POC: YELLOW
URINE BLOOD POC: NORMAL
URINE LEUKOCYTES POC: NEGATIVE
URINE NITRITES POC: NEGATIVE

## 2019-07-24 NOTE — PROGRESS NOTES
Mr. Cathy Gates has a reminder for a \"due or due soon\" health maintenance. I have asked that he contact his primary care provider for follow-up on this health maintenance.

## 2019-07-24 NOTE — PATIENT INSTRUCTIONS
Prostate Cancer: Care Instructions  Your Care Instructions    The prostate gland is a small, walnut-shaped organ that lies just below a man's bladder. It surrounds the urethra, the tube that carries urine from the bladder out of the body through the penis. Prostate cancer is the abnormal growth of cells in the prostate gland. Prostate cancer cells can spread within the prostate, to nearby lymph nodes and other tissues, and to other parts of the body. When the cancer hasn't spread outside the prostate, it is called localized prostate cancer. With localized prostate cancer, your options depend on how likely it is that your cancer will grow. Tests will show if your cancer is likely to grow. · Low-risk cancer isn't likely to grow right away. If your cancer is low-risk, you can choose active surveillance. This means your cancer will be watched closely by your doctor with regular checkups and tests to see if the cancer grows. This choice allows you to delay having surgery or radiation, often for many years. If the cancer grows very slowly, you may never need treatment. · Medium-risk cancer is more likely to grow. Some men with this type of cancer may be able to choose active surveillance. Others may need to choose surgery or radiation. · High-risk cancer is most likely to grow. If you have high-risk cancer, you will likely need to choose surgery or radiation. If your cancer has already spread outside the prostate or to other parts of the body, then you may have other treatments, like chemotherapy or hormone therapy. If you are over age [de-identified] or have other serious health problems, like heart disease, you may choose not to have treatments to cure your cancer. Instead, you can just have treatments to manage your symptoms. This is called watchful waiting. Finding out that you have cancer is scary. You may feel many emotions and may need some help coping. Seek out family, friends, and counselors for support.  You also can do things at home to make yourself feel better while you go through treatment. Call the [employer] (1-679.905.9046) or visit its website at [address] [employer] for more information. Follow-up care is a key part of your treatment and safety. Be sure to make and go to all appointments, and call your doctor if you are having problems. It's also a good idea to know your test results and keep a list of the medicines you take. How can you care for yourself at home? · Your doctor will talk to you about your treatment options. You may need to learn more about each of them before you can decide which treatment is best for you. · Take your medicines exactly as prescribed. Call your doctor if you think you are having a problem with your medicine. You will get more details on the specific medicines your doctor prescribes. · Eat healthy food. If you do not feel like eating, try to eat food that has protein and extra calories to keep up your strength and prevent weight loss. Drink liquid meal replacements for extra calories and protein. Try to eat your main meal early. · Take steps to control your stress and workload. Learn relaxation techniques. ? Share your feelings. Stress and tension affect our emotions. By expressing your feelings to others, you may be able to understand and cope with them. ? Consider joining a support group. Talking about a problem with your spouse, a good friend, or other people with similar problems is a good way to reduce tension and stress. ? Express yourself through art. Try writing, crafts, dance, or art to relieve stress. Some dance, writing, or art groups may be available just for people who have cancer. ? Be kind to your body and mind. Getting enough sleep, eating a healthy diet, and taking time to do things you enjoy can contribute to an overall feeling of balance in your life and can help reduce stress. ? Get help if you need it.  Discuss your concerns with your doctor or counselor. · Get some physical activity every day, but do not get too tired. Keep doing the hobbies you enjoy as your energy allows. · If you are vomiting or have diarrhea:  ? Drink plenty of fluids (enough so that your urine is light yellow or clear like water) to prevent dehydration. Choose water and other caffeine-free clear liquids. If you have kidney, heart, or liver disease and have to limit fluids, talk with your doctor before you increase the amount of fluids you drink. ? When you are able to eat, try clear soups, mild foods, and liquids until all symptoms are gone for 12 to 48 hours. Jell-O, dry toast, crackers, and cooked cereal are also good choices. · If you have not already done so, prepare a list of advance directives. Advance directives are instructions to your doctor and family members about what kind of care you want if you become unable to speak or express yourself. When should you call for help? Call 911 anytime you think you may need emergency care. For example, call if:    · You passed out (lost consciousness).    Call your doctor now or seek immediate medical care if:    · You have new or worse pain.     · You have new symptoms, such as a cough, belly pain, vomiting, diarrhea, or a rash.     · You have symptoms of a urinary tract infection. For example:  ? You have blood or pus in your urine. ? You have pain in your back just below your rib cage. This is called flank pain. ? You have a fever, chills, or body aches. ? It hurts to urinate. ? You have groin or belly pain.    Watch closely for changes in your health, and be sure to contact your doctor if:    · You have swollen glands in your armpits, groin, or neck.     · You have trouble controlling your urine.     · You do not get better as expected. Where can you learn more? Go to http://taisha-jacinta.info/. Enter V141 in the search box to learn more about \"Prostate Cancer: Care Instructions. \"  Current as of: December 19, 2018  Content Version: 12.1  © 3169-2963 [employer]. Care instructions adapted under license by [employer] (which disclaims liability or warranty for this information). If you have questions about a medical condition or this instruction, always ask your healthcare professional. [address] any warranty or liability for your use of this information.

## 2019-07-25 NOTE — PROGRESS NOTES
Rayne Barrios 69 y.o. male     Mr. Jarrod Choi seen today for voiding assessment status post gold fiducial marker placement with spacer gel implantation on 19 July 2019  Patient reports normal voiding since fiducial marker placement-no complaints regarding urination at this time      prostate carcinoma follow-up here today on referral from [address] [place] [address] radiation oncology center for prostate fiducial marker placement and spacer gel implantation pending definitive therapy with combination radioactive seed implantation with external beam radiation  Matthew 10 adenocarcinoma the prostate found on prostate biopsy prompted by PSA elevation 12.0     Patient has mild irritative and obstructive voiding symptoms responding favorably to alpha-blocker treatment with Flomax 0.4 mg daily  No family history of prostate malignancy     PSA 12.0 March 2019     94 core transrectal prostate biopsy prostate volume 49 cc PSA density 0.24/[place] 7-10 histology in 7 of 12 cores  ([facility])     Bone scan negative for metastatic disease  CT scan imaging of abdomen pelvis negative for signs of valencia disease     19 June 2019      Casodex/Lupron androgen deprivation therapy initiated     cc in July 2019     Prostate Biopsy Histology Report 25 March 2019  Right apex-high-grade PIN  Right mid [place] 4+5 = 9 adenocarcinoma 2 cores  Right mid [place] 4+5 = 9 adenocarcinoma 1 of 2 cores  Right base [place] 4+3 = 7 adenocarcinoma 1 of 2 cores  Left apex Matthew 4+3 equal 7 adenocarcinoma 2 of 2 cores  Left mid atypia suspicious for high-grade carcinoma-PIN  Left base [place] score 5+5 = 10 adenocarcinoma 1 of 2 cores     Review of Systems:  CNS-no seizure syncope headaches dizziness or visual changes  Respiratory no wheezes rales rhonchi   Cardiovascular-no palpitations no angina  Gastrointestinal no dyspepsia diarrhea or constipation  Genitourinary-mild urgency frequency with nocturia no dysuria no gross hematuria  Muscular skeletal no joint or bone pain  Endocrine no diabetes no thyroid disease       Allergies: No Known Allergies   Medications:    Current Outpatient Medications   Medication Sig Dispense Refill    tamsulosin (FLOMAX) 0.4 mg capsule Take 0.4 mg by mouth.  docusate sodium (COLACE) 100 mg capsule Take 1 Cap by mouth two (2) times a day for 5 days. 10 Cap 2       Past Medical History:   Diagnosis Date    Personal history of prostate cancer       History reviewed. No pertinent surgical history.   Social History     Socioeconomic History    Marital status: UNKNOWN     Spouse name: Not on file    Number of children: Not on file    Years of education: Not on file    Highest education level: Not on file   Occupational History    Not on file   Social Needs    Financial resource strain: Not on file    Food insecurity:     Worry: Not on file     Inability: Not on file    Transportation needs:     Medical: Not on file     Non-medical: Not on file   Tobacco Use    Smoking status: Never Smoker    Smokeless tobacco: Never Used   Substance and Sexual Activity    Alcohol use: Not Currently    Drug use: Not Currently    Sexual activity: Not Currently   Lifestyle    Physical activity:     Days per week: Not on file     Minutes per session: Not on file    Stress: Not on file   Relationships    Social connections:     Talks on phone: Not on file     Gets together: Not on file     Attends Hinduism service: Not on file     Active member of club or organization: Not on file     Attends meetings of clubs or organizations: Not on file     Relationship status: Not on file    Intimate partner violence:     Fear of current or ex partner: Not on file     Emotionally abused: Not on file     Physically abused: Not on file     Forced sexual activity: Not on file   Other Topics Concern    Not on file   Social History Narrative    Not on file      Family History   Problem Relation Age of Onset    Cancer Mother     Hypertension Mother         Physical Examination: Well-nourished mature male in no apparent distress    Urinalysis: Negative dipstick/nitrite negative/+heme    PVR today 136 cc      Impression: Prostate carcinoma stable status post gold fiducial marker placement with spacer gel implantation        Plan: Patient referred back to radiation oncology in [facility]    RTC PRN       More than 1/2 of this 15 minute visit was spent in counselling and coordination of care, as described above. Elza Aase, MD  -electronically signed-    PLEASE NOTE:  This document has been produced using voice recognition software. Unrecognized errors in transcription may be present.

## 2023-01-11 NOTE — OP NOTES
Problem: Plan of Care - These are the overarching goals to be used throughout the patient stay.    Goal: Plan of Care Review  Outcome: Progressing  Goal: Patient-Specific Goal (Individualized)  Outcome: Progressing  Goal: Absence of Hospital-Acquired Illness or Injury  Outcome: Progressing  Intervention: Identify and Manage Fall Risk  Recent Flowsheet Documentation  Taken 1/10/2023 2013 by Jocelyne Sanchez RN  Safety Promotion/Fall Prevention:   activity supervised   assistive device/personal items within reach   patient and family education   nonskid shoes/slippers when out of bed  Taken 1/10/2023 1607 by Jocelyne Sanchez RN  Safety Promotion/Fall Prevention:   activity supervised   assistive device/personal items within reach   patient and family education   nonskid shoes/slippers when out of bed  Intervention: Prevent Skin Injury  Recent Flowsheet Documentation  Taken 1/10/2023 2013 by Jocelyne Sanchez RN  Body Position: position changed independently  Taken 1/10/2023 1607 by Jocelyne Sanchez RN  Body Position: position changed independently  Goal: Optimal Comfort and Wellbeing  Outcome: Progressing  Intervention: Monitor Pain and Promote Comfort  Recent Flowsheet Documentation  Taken 1/10/2023 1606 by Jocelyne Sanchez RN  Pain Management Interventions: declines  Goal: Readiness for Transition of Care  Outcome: Progressing     Problem: Pain Acute  Goal: Optimal Pain Control and Function  Outcome: Progressing  Intervention: Develop Pain Management Plan  Recent Flowsheet Documentation  Taken 1/10/2023 1606 by Jocelyne Sanchez RN  Pain Management Interventions: declines  Intervention: Prevent or Manage Pain  Recent Flowsheet Documentation  Taken 1/10/2023 2013 by Jocelyne Sanchez RN  Medication Review/Management: medications reviewed  Taken 1/10/2023 1607 by Jocelyne Sanchez RN  Medication Review/Management: medications reviewed   Goal Outcome Evaluation:                         Op report dictated    Elias Ingram MD

## 2023-01-31 RX ORDER — MEMANTINE HYDROCHLORIDE 10 MG/1
5 TABLET ORAL 2 TIMES DAILY
COMMUNITY
Start: 2022-05-20

## 2023-01-31 RX ORDER — TAMSULOSIN HYDROCHLORIDE 0.4 MG/1
0.4 CAPSULE ORAL
COMMUNITY

## 2023-07-08 NOTE — DISCHARGE SUMMARY
Transperineal ultrasound-guided prostate gold fiducial marker placement with spacer gel implantation under general anesthesia by LMA-uncomplicated    Jessi Esteban MD
Anemia

## (undated) DEVICE — GARMENT,MEDLINE,DVT,INT,CALF,MED, GEN2: Brand: MEDLINE

## (undated) DEVICE — STER SINGLE BASIN SET W/BOWLS: Brand: CARDINAL HEALTH

## (undated) DEVICE — TABLE COVER: Brand: CONVERTORS

## (undated) DEVICE — DRAPE TWL SURG 16X26IN BLU ORB04] ALLCARE INC]

## (undated) DEVICE — GAUZE,SPONGE,4"X4",16PLY,STRL,LF,10/TRAY: Brand: MEDLINE

## (undated) DEVICE — 4-PORT MANIFOLD: Brand: NEPTUNE 2

## (undated) DEVICE — KIT CLN UP BON SECOURS MARYV

## (undated) DEVICE — STERILE POLYISOPRENE POWDER-FREE SURGICAL GLOVES: Brand: PROTEXIS

## (undated) DEVICE — SOLUTION IV 1000ML 0.9% SOD CHL

## (undated) DEVICE — SKIN MARKER,REGULAR TIP WITH RULER AND LABELS: Brand: DEVON

## (undated) DEVICE — SPACER RECTAL HYDRGEL 10ML -- SPACEOAR

## (undated) DEVICE — STERILE (2.0 X 30CM) COVER: Brand: NEOGUARD™ TRANSDUCER COVER

## (undated) DEVICE — CATHETER F BLLN 5CC 18FR 2 W HYDRGEL COAT LESS TRAUM LUB

## (undated) DEVICE — LUB SURG MEDC STRL 2OZ TUBE MC -- MEDICHOICE

## (undated) DEVICE — LIGHT HANDLE: Brand: DEVON

## (undated) DEVICE — GOWN,REINFORCED,POLY,AURORA,XLARGE,STRL: Brand: MEDLINE

## (undated) DEVICE — URINARY LEG BAG COMBINATION PACK: Brand: HOLLISTER

## (undated) DEVICE — SYR 10ML LUER LOK 1/5ML GRAD --

## (undated) DEVICE — STANDARD HYPODERMIC NEEDLE,ALUMINUM HUB: Brand: MONOJECT

## (undated) DEVICE — 3M™ BAIR PAWS FLEX™ WARMING GOWN, STANDARD, 20 PER CASE 81003: Brand: BAIR PAWS™